# Patient Record
Sex: MALE
[De-identification: names, ages, dates, MRNs, and addresses within clinical notes are randomized per-mention and may not be internally consistent; named-entity substitution may affect disease eponyms.]

---

## 2020-01-24 ENCOUNTER — TRANSCRIBE ORDERS (OUTPATIENT)
Dept: OTHER | Age: 30
End: 2020-01-24

## 2020-01-24 DIAGNOSIS — J39.8 STENOSIS OF TRACHEA: Primary | ICD-10-CM

## 2020-01-27 ENCOUNTER — TELEPHONE (OUTPATIENT)
Dept: ONCOLOGY | Facility: CLINIC | Age: 30
End: 2020-01-27

## 2020-01-27 NOTE — TELEPHONE ENCOUNTER
Called patient.Left  with hours and phone. Letter sent for follow up. Referral in [facility].

## 2020-01-31 NOTE — TELEPHONE ENCOUNTER
Action    Action Taken 1/31/20:          RECORDS STATUS - ALL OTHER DIAGNOSIS      RECORDS RECEIVED FROM: [facility]   DATE RECEIVED:    NOTES STATUS DETAILS   OFFICE NOTE from referring provider     OFFICE NOTE from medical oncologist     DISCHARGE SUMMARY from hospital     DISCHARGE REPORT from the ER     OPERATIVE REPORT     MEDICATION LIST     CLINICAL TRIAL TREATMENTS TO DATE     LABS     PATHOLOGY REPORTS     ANYTHING RELATED TO DIAGNOSIS     GENONOMIC TESTING     TYPE:     IMAGING (NEED IMAGES & REPORT)     CT SCANS     MRI     MAMMO     ULTRASOUND     PET

## 2020-02-10 ENCOUNTER — TELEPHONE (OUTPATIENT)
Dept: PULMONOLOGY | Facility: CLINIC | Age: 30
End: 2020-02-10

## 2020-02-10 NOTE — TELEPHONE ENCOUNTER
Called patient to reschedule appointment scheduled with Dr. Cueto. Patient needs to see Interventional Pulmonology, per the clinic. Please schedule first available with Dr. Kenton Huff, Dr. Anastasia Wallace, Dr. Walt Liz, or Dr. Prasad Burton.

## 2020-02-11 NOTE — TELEPHONE ENCOUNTER
Action    Action Taken 2/11/20: LVM for pt re: recs call. IB sent to Clinic/Provider  9:23 AM    2/13/20: 2nd vm.   2:25 PM    2/14/20: 3rd VM  4:26 PM    -2/18/20: Pt returned call. Confirmed not recent tx//last treated when he was 14 (roughly 2005) @ [facility] (TOI emailed to pt). IB sent to provider/clinic.   11:32 AM         RECORDS STATUS - ALL OTHER DIAGNOSIS      RECORDS RECEIVED FROM:    DATE RECEIVED:    NOTES STATUS DETAILS   OFFICE NOTE from referring provider     OFFICE NOTE from medical oncologist     DISCHARGE SUMMARY from hospital     DISCHARGE REPORT from the ER     OPERATIVE REPORT     MEDICATION LIST     CLINICAL TRIAL TREATMENTS TO DATE     LABS     PATHOLOGY REPORTS     ANYTHING RELATED TO DIAGNOSIS     GENONOMIC TESTING     TYPE:     IMAGING (NEED IMAGES & REPORT)     CT SCANS     MRI     MAMMO     ULTRASOUND     PET

## 2020-02-12 ENCOUNTER — PRE VISIT (OUTPATIENT)
Dept: SURGERY | Facility: CLINIC | Age: 30
End: 2020-02-12

## 2020-02-18 DIAGNOSIS — J39.8 TRACHEAL STENOSIS: Primary | ICD-10-CM

## 2020-02-26 ENCOUNTER — ANCILLARY PROCEDURE (OUTPATIENT)
Dept: CT IMAGING | Facility: CLINIC | Age: 30
End: 2020-02-26
Attending: CLINICAL NURSE SPECIALIST
Payer: COMMERCIAL

## 2020-02-26 ENCOUNTER — OFFICE VISIT (OUTPATIENT)
Dept: PULMONOLOGY | Facility: CLINIC | Age: 30
End: 2020-02-26
Attending: INTERNAL MEDICINE
Payer: COMMERCIAL

## 2020-02-26 ENCOUNTER — TELEPHONE (OUTPATIENT)
Dept: OTOLARYNGOLOGY | Facility: CLINIC | Age: 30
End: 2020-02-26

## 2020-02-26 ENCOUNTER — PRE VISIT (OUTPATIENT)
Dept: PULMONOLOGY | Facility: CLINIC | Age: 30
End: 2020-02-26

## 2020-02-26 VITALS
OXYGEN SATURATION: 98 % | DIASTOLIC BLOOD PRESSURE: 79 MMHG | BODY MASS INDEX: 27.99 KG/M2 | SYSTOLIC BLOOD PRESSURE: 135 MMHG | HEART RATE: 88 BPM | TEMPERATURE: 98.2 F | RESPIRATION RATE: 16 BRPM | HEIGHT: 69 IN | WEIGHT: 189 LBS

## 2020-02-26 DIAGNOSIS — J39.8 TRACHEAL STENOSIS: Primary | ICD-10-CM

## 2020-02-26 DIAGNOSIS — J39.8 TRACHEAL STENOSIS: ICD-10-CM

## 2020-02-26 PROCEDURE — G0463 HOSPITAL OUTPT CLINIC VISIT: HCPCS | Mod: ZF

## 2020-02-26 ASSESSMENT — MIFFLIN-ST. JEOR: SCORE: 1812.28

## 2020-02-26 ASSESSMENT — PAIN SCALES - GENERAL: PAINLEVEL: NO PAIN (0)

## 2020-02-26 NOTE — PROGRESS NOTES
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  He has not seen or followed by anyone for the last 15 years.  He has noisy breathing/stridorous breathing while sleeping at night.  He has no any other new symptom.  He has no worsening of his symptoms.  His CT scan of the chest from today revealed no tracheal stenosis from the subglottic area down to his lungs.  Right vocal cord appeared to be asymmetric likely suggesting paralysis.  I will refer him to ear nose and throat (Dr. Janelle Gordon) for further evaluation.    Billing: I spent more than 30 minutes face to face and greater than 50% of time was for counseling and coordination of care about tracheal stenosis      Prasad Burton MD     History of Present Illness:    Cristopher Hayden is a 29 year old male with sig h/o for tracheal stenosis who is here for evaluation/followup of tracheal stenosis.  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  He has history of breathing problem as an infant requiring tracheostomy placement and multiple surgeries as indicated above.  He does not have shortness of breath however noisy breathing and stridorous breathing which has not changed for the last 15 years.  He has not been followed with any provider at this point but would like to be checked out to make sure that everything is fine.    Exposure history: Asbestos;  No , TB;  No , Radiation;   No       Social History      Socioeconomic History     Marital status: Single     Spouse name: Not on file     Number of children: Not on file     Years of education: Not on file     Highest education level: Not on file   Occupational History     Not on file   Social Needs     Financial resource strain: Not on file     Food insecurity:     Worry: Not on file     Inability: Not on file     Transportation needs:     Medical: Not on file     Non-medical: Not on file   Tobacco Use     Smoking status: Never Smoker     Smokeless tobacco: Never Used   Substance and Sexual Activity     Alcohol use: Not on file     Drug use: Not on file     Sexual activity: Not on file   Lifestyle     Physical activity:     Days per week: Not on file     Minutes per session: Not on file     Stress: Not on file   Relationships     Social connections:     Talks on phone: Not on file     Gets together: Not on file     Attends Anglican service: Not on file     Active member of club or organization: Not on file     Attends meetings of clubs or organizations: Not on file     Relationship status: Not on file     Intimate partner violence:     Fear of current or ex partner: Not on file     Emotionally abused: Not on file     Physically abused: Not on file     Forced sexual activity: Not on file   Other Topics Concern     Not on file   Social History Narrative     Not on file        No family history on file.       There is no immunization history on file for this patient.    No current outpatient medications on file.     No current facility-administered medications for this visit.         Review of Systems:  I have done 10 points of review systems and pertinent findings are  ,otherwise negative.    Physical examination  Constitutional: Alert, oriented, not in distress  Vitals: B/P: 135/79, T: 98.2, P: 88, R: 16  Eyes: No icterus, nystagmus, pupils isocoric   Musculoskeletal: Normal muscle mass, no dephormity  Integumentary:  No rash, normal texture and turgor, no  edema  Neurological: Alert, orientedx3, no motor deficits, cranial nerves grossly normal  Psychiatric:  Mood and affect are appropriate with insight into his/her medical condition  Hematologic/Immunologic/Lymphatic: No bruise, no lymph node enargement       Thank you for allowing me participate in the care of Cristopher HaydenSean MARTIN. Prasad Burton MD      Answers for HPI/ROS submitted by the patient on 2/25/2020   General Symptoms: No  Skin Symptoms: No  HENT Symptoms: No  EYE SYMPTOMS: No  HEART SYMPTOMS: No  LUNG SYMPTOMS: No  INTESTINAL SYMPTOMS: No  URINARY SYMPTOMS: No  REPRODUCTIVE SYMPTOMS: No  SKELETAL SYMPTOMS: No  BLOOD SYMPTOMS: No  NERVOUS SYSTEM SYMPTOMS: No  MENTAL HEALTH SYMPTOMS: No

## 2020-02-26 NOTE — TELEPHONE ENCOUNTER
Called patient and left a VM.    Informed him that I am calling from Dr. Gordon's office at the ENT clinic, and that we have received a referral from Dr. Prasad Burton.    I let the patient know that we have him scheduled for Friday 3/6 for a consult with Dr. Gordon, and provided my direct line to call back if appointment date/time does not work.

## 2020-02-26 NOTE — TELEPHONE ENCOUNTER
FUTURE VISIT INFORMATION      FUTURE VISIT INFORMATION:    Date: 3/6/20    Time: 12:30 PM    [place] [facility] [place]  REFERRAL INFORMATION:    Referring provider:  Dr. Prasad Burton    Referring providers clinic:  [facility] - Pulmonology    Reason for visit/diagnosis: Tracheal Stenosis    RECORDS REQUESTED FROM:       Clinic name Comments Records Status Imaging Status   [facility] 2/26/20 - PULM OV with Dr. Burton Epic    [facility] - Imaging 2/26/20 - CT Chest WO Epic PACs         [facility] 3 Laryngotracheoplasties between 1990s to 2000s (Dr. Arreaga) 3/19 Sent to Scan                  * 2/26/20 1:58 PM Faxed Urgent req to [facility] for OP notes to be sent to clinic - [place]  * 2/28/20 8:30 AM LVM with patient to have him sign a release of information so that we can get the OP note from [facility], e-mailed TOI - [place]  * 3/4/20 8:48 AM Left 3rd  with patient for TOI to be signed, closing encounter for now and notified nurse to get TOI signed when in clinic - [place]le  * 3/4/20 Faxed req with Signed TOI to [facility] for OP Notes - [place]le

## 2020-02-26 NOTE — NURSING NOTE
"Oncology Rooming Note    February 26, 2020 11:05 AM   Cristopher Hayden is a 29 year old male who presents for:    Chief Complaint   Patient presents with     New Patient     NEW PT; STENOSIS OF TRACHEA; VITALS TAKEN     Initial Vitals: /79   Pulse 88   Temp 98.2  F (36.8  C) (Oral)   Resp 16   Ht 1.76 m (5' 9.29\")   Wt 85.7 kg (189 lb)   SpO2 98%   BMI 27.68 kg/m   Estimated body mass index is 27.68 kg/m  as calculated from the following:    Height as of this encounter: 1.76 m (5' 9.29\").    Weight as of this encounter: 85.7 kg (189 lb). Body surface area is 2.05 meters squared.  No Pain (0) Comment: Data Unavailable   No LMP for male patient.  Allergies reviewed: Yes  Medications reviewed: Yes    Medications: Medication refills not needed today.  Pharmacy name entered into EPIC: Data Unavailable    Clinical concerns: No new concerns today  Dr Burton  was notified.      Jane Laws              "

## 2020-02-26 NOTE — LETTER
2/26/2020       RE: Cristopher Hayden  [address]  [place] [address]     Dear Colleague,    Thank you [facility] your [facility]. Please see a copy of my visit note below.    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  He has not seen or followed by anyone for the last 15 years.  He has noisy breathing/stridorous breathing while sleeping at night.  He has no any other new symptom.  He has no worsening of his symptoms.  His CT scan of the chest from today revealed no tracheal stenosis from the subglottic area down to his lungs.  Right vocal cord appeared to be asymmetric likely suggesting paralysis.  I will refer him to ear nose and throat (Dr. Janelle Gordon) for further evaluation.    Billing: I spent more than 30 minutes face to face and greater than 50% of time was for counseling and coordination of care about tracheal stenosis      Prasad Burton MD     History of Present Illness:    Cristopher Hayden is a 29 year old male with sig h/o for tracheal stenosis who is here for evaluation/followup of tracheal stenosis.  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  He has history of breathing problem as an infant requiring tracheostomy placement and multiple surgeries as indicated above.  He does not have shortness of breath however noisy breathing  and stridorous breathing which has not changed for the last 15 years.  He has not been followed with any provider at this point but would like to be checked out to make sure that everything is fine.    Exposure history: Asbestos;  No , TB;  No , Radiation;   No       Social History     Socioeconomic History     Marital status: Single     Spouse name: Not on file     Number of children: Not on file     Years of education: Not on file     Highest education level: Not on file   Occupational History     Not on file   Social Needs     Financial resource strain: Not on file     Food insecurity:     Worry: Not on file     Inability: Not on file     Transportation needs:     Medical: Not on file     Non-medical: Not on file   Tobacco Use     Smoking status: Never Smoker     Smokeless tobacco: Never Used   Substance and Sexual Activity     Alcohol use: Not on file     Drug use: Not on file     Sexual activity: Not on file   Lifestyle     Physical activity:     Days per week: Not on file     Minutes per session: Not on file     Stress: Not on file   Relationships     Social connections:     Talks on phone: Not on file     Gets together: Not on file     Attends Taoist service: Not on file     Active member of club or organization: Not on file     Attends meetings of clubs or organizations: Not on file     Relationship status: Not on file     Intimate partner violence:     Fear of current or ex partner: Not on file     Emotionally abused: Not on file     Physically abused: Not on file     Forced sexual activity: Not on file   Other Topics Concern     Not on file   Social History Narrative     Not on file        No family history on file.       There is no immunization history on file for this patient.    No current outpatient medications on file.     No current facility-administered medications for this visit.         Review of Systems:  I have done 10 points of review systems and pertinent findings are  ,otherwise  negative.    Physical examination  Constitutional: Alert, oriented, not in distress  Vitals: B/P: 135/79, T: 98.2, P: 88, R: 16  Eyes: No icterus, nystagmus, pupils isocoric   Musculoskeletal: Normal muscle mass, no dephormity  Integumentary:  No rash, normal texture and turgor, no edema  Neurological: Alert, orientedx3, no motor deficits, cranial nerves grossly normal  Psychiatric:  Mood and affect are appropriate with insight into his/her medical condition  Hematologic/Immunologic/Lymphatic: No bruise, no lymph node enargement       Thank you for allowing me participate in the care of Cristopher BYNUM Prasad Burton MD      Answers for HPI/ROS submitted by the patient on 2/25/2020   General Symptoms: No  Skin Symptoms: No  HENT Symptoms: No  EYE SYMPTOMS: No  HEART SYMPTOMS: No  LUNG SYMPTOMS: No  INTESTINAL SYMPTOMS: No  URINARY SYMPTOMS: No  REPRODUCTIVE SYMPTOMS: No  SKELETAL SYMPTOMS: No  BLOOD SYMPTOMS: No  NERVOUS SYSTEM SYMPTOMS: No  MENTAL HEALTH SYMPTOMS: No      Again, thank you for allowing me to participate in the care of your patient.      Sincerely,    Jeremiah Burton MD

## 2020-03-05 NOTE — PROGRESS NOTES
[facility]   at the [facility]   Otolaryngology Clinic     Patient: Cristopher Hayden    MRN: [id number]    : 1990    Age/Gender: 29 year old male  Date of Service: 3/6/2020  Rendering Provider:   Janelle Gordon MD       Referring Provider   PCP: Adilson Calvin  Referring Physician: Prasad Burton   Reason for Consultation   History of subglottic stenosis s/p reconstruction  Snoring at night  History   HISTORY OF PRESENT ILLNESS: I was asked to consult on Cristopher Hayden, by Prasad Burton for evaluation of subglottic stenosis. Mr. 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 He has not had an ENT in 15 years, so he is here today to establish care and make sure his throat looks okay. 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 He has always had stridorous breathing at night. He states this goes on for 80-90% of the time he is sleeping. It does not wake him, but it wakes others up. This has not worsened. He has not had any sleep studies since he was young. He does not fall asleep during the day, but does report intermittent fatigue. He notes he has had echos to evaluate whether his breathing is affecting his heart.    He also reports mild shortness of breath, but it does not affect him much. When he gets sick, he sometimes needs steroids to help with inflammation, but not always. He had influenza last year and was fine during that time. He is fine taking the stairs and working out and does not feel his activities are limited. He notes he has been clearing his throat more frequently lately, but thinks this might be due to allergies. He reports occasional epistaxis.    He denies dysphagia. His dysphonia does not bother him and his voice has always been this way and is not worsening.    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     Procedure history  Tracheostomy - 1990s  Laryngotracheoplasty (x3) - 1990s-2000s at [facility]  Left partial endoscopic laser arytenoidectomy - 07/05/2006    The patient is here today accompanied by his mother.     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         PAST SURGICAL HISTORY:   Past Surgical History:   Procedure Laterality Date     ------------OTHER------------- Left 07/05/2006    Left partial endoscopic laser arytenoidectomy      TRACHEOSTOMY  1990s    in childhood at 3 years of age           CURRENT MEDICATIONS:   Current Outpatient Medications:      amoxicillin-clavulanate (AUGMENTIN) 875-125 MG tablet, Take 1 tablet by mouth 2 times daily, Disp: 28 tablet, Rfl: 0     methylPREDNISolone (MEDROL DOSEPAK) 4 MG tablet therapy pack, Follow Package Directions, Disp: 21 tablet, Rfl: 0      ALLERGIES: Patient has no known allergies.      SOCIAL HISTORY:    Social History     Socioeconomic History     Marital status: Single     Spouse name: Not on file     Number of children: Not on file     Years of education: Not on file     Highest education level: Not on file   Occupational History     Not on file   Social Needs     Financial resource strain: Not on file     Food insecurity     Worry: Not on file     Inability: Not on file     Transportation needs     Medical: Not on file     Non-medical: Not on file   Tobacco Use     Smoking status: Never Smoker     Smokeless tobacco: Never Used   Substance and Sexual Activity     Alcohol use: Not on file     Drug use: Not on file     Sexual activity: Not on file   Lifestyle     Physical activity     Days per week: Not on  file     Minutes per session: Not on file     Stress: Not on file   Relationships     Social connections     Talks on phone: Not on file     Gets together: Not on file     Attends [employer] service: Not on file     Active member of club or organization: Not on file     Attends meetings of clubs or organizations: Not on file     Relationship status: Not on file     Intimate partner violence     Fear of current or ex partner: Not on file     Emotionally abused: Not on file     Physically abused: Not on file     Forced sexual activity: Not on file   Other Topics Concern     Not on file   Social History Narrative     Not on file         FAMILY HISTORY: No family history on file.  Non-contributory for problems with anesthesia    REVIEW OF SYSTEMS:   The patient was asked a 14 point review of systems regarding constitutional symptoms, eye symptoms, ears, nose, mouth, throat symptoms, cardiovascular symptoms, respiratory symptoms, gastrointestinal symptoms, genitourinary symptoms, musculoskeletal symptoms, integumentary symptoms, neurological symptoms, psychiatric symptoms, endocrine symptoms, hematologic/lymphatic symptoms, and allergic/ immunologic symptoms.   The pertinent factors have been included in the HPI and below.    Physical Examination     The patient underwent a physical examination as described below. The pertinent positive and negative findings are summarized after the description of the examination.    Constitutional: The patient's developmental and nutritional status was assessed. The patient's voice quality was assessed.  Head and Face: The head and face were inspected for deformities. The sinuses were palpated. The salivary glands were palpated. Facial muscle strength was assessed bilaterally.  Eyes: Extraocular movements and primary gaze alignment were assessed.  Ears, Nose, Mouth and Throat: The ears and nose were examined for deformities. The nasal septum, mucosa, and turbinates were inspected by  anterior rhinoscopy. The lips, teeth, and gums were examined for abnormalities. The oral mucosa, tongue, palate, tonsils, lateral and posterior pharynx were inspected for the presence of asymmetry or mucosal lesions.    Neck: The tracheal position was noted, and the neck mass palpated to determine if there were any asymmetries, abnormal neck masses, thyromegally, or thyroid nodules.  Respiratory: The nature of the breathing and chest expansion/symmetry was observed.  Cardiovascular: The patient was examined to determine the presence of any edema or jugular venous distension.  Abdomen: The contour of the abdomen was noted.  Lymphatic: The patient was examined for infraclavicular lymphadenopathy.  Musculoskeletal: The patient was inspected for the presence of skeletal deformities.  Extremities: The extremities were examined for any clubbing or cyanosis.  Skin: The skin was examined for inflammatory or neoplastic conditions.  Neurologic: The patient's orientation, mood, and affect were noted. The cranial nerve  functions were examined.    Other pertinent positive and negative findings on physical examination:   each ear: EACs with bilateral cerumen impaction  Anterior rhinoscopy: no lesions  OC/OP: no lesions, uvula midline, prior tonsillectomy, soft palate elevates symmetrically, FOM/BOT soft, mild lower kimberly   Neck: well-healed midline incision, no TH tenderness to palpation    All other physical examination findings were within normal limits and noncontributory.    Procedures     Flexible laryngoscopy (CPT 57918)      Pre-procedure diagnosis:  Post-procedure diagnosis:  Indication for procedure: Mr. Hayden is a 29 year old male with a history of subglottic stenosis  Procedure(s): Fiberoptic Laryngoscopy    Details of Procedure: After informed consent was obtained, the patient was seated in the examination chair.  The areas of the nasopharynx as well as the hypopharynx were anesthetized with topical 4% lidocaine with  0.25% phenylephrine atomizer.  Examination of the base of tongue was performed first.  Attention was directed to any evidence of masses in the area or evidence of leukoplakia or candidal infection.  Attention was directed to the epiglottis where its size and position was determined and its movement on phonation of the vowel  e .  The piriform sinuses were then inspected for any mass lesions or pooling of secretions.  Attention was then directed to the larynx. The vocal folds were inspected for infection or any areas of leukoplakia, for masses, polypoid degeneration, or hemorrhage.  Having done this, the arytenoids and vocal processes were inspected for erythema or evidence of granuloma formation.  The posterior commissure was then inspected for evidence of inflammatory changes in the mucosa and heaping up of mucosal tissue. The patient was then instructed to say the vowel  e .  Adduction of vocal folds to the midline was observed for any evidence of paresis or paralysis of the larynx or asymmetry in rotation of the larynx to the left or right. The patient was asked to breathe and the degree of abduction was noted bilaterally.  Subglottic view of the larynx was obtained for any additional mass lesions or mucosal changes.  Finally the post cricoid was examined for evidence of pooling of secretions, as well as the pharyngeal wall mucosa.   Anesthesia type: 0.25% phenylephrine    Findings  Anatomic/physiological deviations: Supraglottic stenosis with dynamic collapse, anterior glottic web, difficult to see further   Right vocal process: n/a   Left vocal process: n/a   Glottal gap: yes  Supraglottic structures: some abduction movement seen with e sniff, posterior stenosis   Hypopharynx: Normal     Estimated Blood Loss: minimal  Complications: None  Disposition: Patient tolerated the procedure well              Bronchoscopy, Diagnostic    Pre-Procedure Diagnosis: supraglottic stenosis  Post Procedure Diagnosis:  supraglottic stenosis  Indication for procedure:  Mr. Hayden is a 29 year old year old male with supraglottic stenosis  Procedure(s): Bronchoscopy, Diagnostic  Details of Procedure: Topical anesthesia was achieved by spraying 4% topical lidocaine directly into the trachea either through a working channel in the endoscope or via a trans-cutaneous route.  After adequate anesthesia was achieved a flexible bronchoscope was passed through the glottis into the trachea.  Abnormalities were noted. Having completed this the operation was completed and the scope withdrawn atraumatically.   Anesthesia type: 4% topical lidocaine    Findings:   Anterior glottic gap and patent posterior glottic airway, multi level stenosis of the subglottis, able to see tracheal rings beyond  Complication(s): None  Disposition: patient had coughing throughout the procedure        Review of Relevant Clinical Data     Notes: Jeremiah Burton 2/26/20     Radiology: CT chest 2/26/20  1.  Subglottic trachea is within normal limits.  2.  Scattered solid pulmonary nodules measuring up to 5 mm, likely  sequela of prior infection versus inflammation.  3.  Chronic right rib deformities with asymmetric volume loss of the  right hemithorax.      Pathology: none    Procedures: none    Labs:  No results found for: TSH  No results found for: NA, CO2, BUN, CREAT, GLUCOSE, PHOS  No results found for: WBC, HGB, HCT, MCV, PLT  No results found for: PT, PTT, INR  No results found for: PARISH  No components found for: RHEUMATOIDFACTOR,  RF  No results found for: CRP  No components found for: CKTOT, URICACID  No components found for: C3, C4, DSDNAAB, NDNAABIFA  No results found for: MPOAB    Patient reported Quality of Life (QOL) Measures     Patient Supplied Answers To VHI Questionnaire  Voice Handicap Index (VHI-10) 3/5/2020   My voice makes it difficult for people to hear me 2   People have difficulty understanding me in a noisy room 2   My voice difficulties  "restrict my personal and social life.  0   I feel left out of conversations because of my voice 0   My voice problem causes me to lose income 0   I feel as though I have to strain to produce voice 0   The clarity of my voice is unpredictable 0   My voice problem upsets me 0   My voice makes me feel handicapped 0   People ask, \"What's wrong with your voice?\" 1   VHI-10 5         Patient Supplied Answers To EAT Questionnaire  Eating Assessment Tool (EAT-10) 3/5/2020   My swallowing problem has caused me to lose weight 0   My swallowing problem interferes with my ability to go out for meals 0   Swallowing liquids takes extra effort 0   Swallowing solids takes extra effort 0   Swallowing pills takes extra effort 0   Swallowing is painful 0   The pleasure of eating is affected by my swallowing 0   When I swallow food sticks in my throat 0   I cough when I eat 0   Swallowing is stressful 0   EAT-10 0         Patient Supplied Answers To CSI Questionnaire  Cough Severity Index (CSI) 3/5/2020   My cough is worse when I lie down 0   My coughing problem causes me to restrict my personal and social life 0   I tend to avoid places because of my cough problem 0   I feel embarrassed because of my coughing problem 0   People ask, ''What's wrong?'' because I cough a lot 0   I run out of air when I cough 0   My coughing problem affects my voice 0   My coughing problem limits my physical activity 0   My coughing problem upsets me 0   People ask me if I am sick because I cough a lot 0   CSI Score 0        Reflux Symptom Index  Within the last month, how did the following problems affect the patient?  0 = no problem; 5= severe problem  Hoarseness or a problem with your voice 0   Clearing your throat  2   Excess throat mucous or postnasal drip 0   Difficulty swallowing food, liquid or pills 0   Coughing after you ate or after lying down  0   Breathing difficulties or choking episodes 0   Troublesome or annoying cough 0   Sensations of " something sticking in your throat or a lump in your throat  0   Heartburn, chest pain, indigestion, or stomach acid coming up 1         Total: 3    Impression & Plan     IMPRESSION: Mr. Hayden is a 29 year old male who is being seen for the followin. Airway stenosis   - due to prematurity with subglottic stenosis which required reconstruction and multiple procedures. decannulated at 3 years old. Has not had dyspnea or complaints in many years. His CT reveals a patent trachea. Today his scope reveals supraglottic stenosis, an anterior glottic web and subglottic stenosis  - he has no stridor with deep inspiration and is able to go up the stairs and exercise without problems  - discussed that his airway despite its stability and lack of symptoms is narrowed and at risk for episodes of URI   - provided steroids and antibiotics to have on hand for when he is sick  - further discussed that intubation for him would be very difficult with only a small 4 or 5 MLT or PMLT tube, he will need airway clearance for any future procedure and ideally would avoid intubation. He understands that a trach could be possible   - discussed the balance of breathing, voicing and eating and at the current time he is in a good balance and would avoid any surgery  - fixing the supraglottic stenosis would likely lead to dysphagia due to aspiration, no problems with swallow at this time therefore deferred further swallow testing  - will continue to monitor    2. Snoring at night breathing  - this occurs only at night and has been stable for many years and his main concern today  - most likely this represents collapse of the supraglottic tissue at night   - referred for sleep evaluation and sleep study    3. 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     Scribe Disclosure:  I, Isadora Garay, am serving as a scribe to document services personally performed by Janelle Gordon MD at this visit, based upon the provider's statements to me. All documentation has been reviewed by the aforementioned provider prior to being entered into the official medical record.    The documentation recorded by the scribe accurately reflects the services I personally performed and the decisions made by me.    Thank you for the kind referral and for allowing me to share in the care of Mr. Hayden. If you have any questions, please do not hesitate to contact me.    Janelle Gordon MD    of Otolaryngology - Head and Neck Surgery   Voice, Airway, and Swallowing Disorders   [facility] at the [facility]    Clinics & Surgery [address] [address]  Phone: [phone]  Fax: [phone]    [place] [address]  Phone: [phone]  Fax: [phone]     CC: Jeremiah Burton

## 2020-03-05 NOTE — TELEPHONE ENCOUNTER
Talked to Representative from [facility] to check on status of getting OP Notes sent to clinic as we faxed signed TOI.  She stated that it is in the paper charts so it make take some time for them to be able to get the records and send them to us, but they are working on it right now.

## 2020-03-06 ENCOUNTER — OFFICE VISIT (OUTPATIENT)
Dept: OTOLARYNGOLOGY | Facility: CLINIC | Age: 30
End: 2020-03-06
Payer: COMMERCIAL

## 2020-03-06 ENCOUNTER — PRE VISIT (OUTPATIENT)
Dept: OTOLARYNGOLOGY | Facility: CLINIC | Age: 30
End: 2020-03-06

## 2020-03-06 VITALS — HEIGHT: 69 IN | WEIGHT: 150 LBS | BODY MASS INDEX: 22.22 KG/M2

## 2020-03-06 DIAGNOSIS — R06.83 SNORING: Primary | ICD-10-CM

## 2020-03-06 DIAGNOSIS — J38.6 SUBGLOTTIC STENOSIS: ICD-10-CM

## 2020-03-06 DIAGNOSIS — J38.6 SUPRAGLOTTIC STENOSIS: ICD-10-CM

## 2020-03-06 DIAGNOSIS — Q31.0 ANTERIOR GLOTTIC WEB: ICD-10-CM

## 2020-03-06 PROBLEM — J39.8 STENOSIS OF TRACHEA: Status: ACTIVE | Noted: 2020-03-06

## 2020-03-06 RX ORDER — METHYLPREDNISOLONE 4 MG
TABLET, DOSE PACK ORAL
Qty: 21 TABLET | Refills: 0 | Status: SHIPPED | OUTPATIENT
Start: 2020-03-06

## 2020-03-06 ASSESSMENT — MIFFLIN-ST. JEOR: SCORE: 1630.78

## 2020-03-06 ASSESSMENT — PAIN SCALES - GENERAL: PAINLEVEL: NO PAIN (0)

## 2020-03-06 NOTE — NURSING NOTE
"Chief Complaint   Patient presents with     Consult     History of tracheal stenosis, dyspnea     Height 1.753 m (5' 9\"), weight 68 kg (150 lb).    Fatimah Neumann, EMT  "

## 2020-03-06 NOTE — LETTER
3/6/2020       RE: Cristopher Hayden  [address]  [place] [address]     Dear Colleague,    Thank you for referring your patient, Cristopher Hayden, to the [facility] EAR NOSE AND THROAT at [facility]. Please see a copy of my visit note below.        [facility]   at the [facility]   Otolaryngology Clinic     Patient: Cristopher Hayden    MRN: [id number]    : 1990    Age/Gender: 29 year old male  Date of Service: 3/6/2020  Rendering Provider:   Janelle Gordon MD       Referring Provider   PCP: Adilson Calvin  Referring Physician: Prasad Burton   Reason for Consultation   History of subglottic stenosis s/p reconstruction  Snoring at night  History   HISTORY OF PRESENT ILLNESS: I was asked to consult on Cristopher Hayden, by Prasad Burton for evaluation of subglottic stenosis. Mr. 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 He has not had an ENT in 15 years, so he is here today to establish care and make sure his throat looks okay. 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 He has always had stridorous breathing at night. He states this goes on for 80-90% of the time he is sleeping. It does not wake him, but it wakes others up. This has not worsened. He has not had any sleep studies since he was young. He does not fall asleep during the day, but does report intermittent fatigue. He notes he has had echos to evaluate whether his breathing is affecting his heart.    He also reports mild shortness of breath, but it does not affect him much. When he gets sick, he sometimes needs steroids to help with inflammation, but not always. He had influenza last year and was fine during that time. He is fine taking the stairs and working out and does not feel his activities are limited. He notes he has been clearing his throat more  frequently lately, but thinks this might be due to allergies. He reports occasional epistaxis.    He denies dysphagia. His dysphonia does not bother him and his voice has always been this way and is not worsening.    He works as a respiratory therapist at 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     Procedure history  Tracheostomy - 1990s  Laryngotracheoplasty (x3) - 1990s-2000s at [facility]  Left partial endoscopic laser arytenoidectomy - 07/05/2006    The patient is here today accompanied by his mother.     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         PAST SURGICAL HISTORY:   Past Surgical History:   Procedure Laterality Date     ------------OTHER------------- Left 07/05/2006    Left partial endoscopic laser arytenoidectomy      TRACHEOSTOMY  1990s    in childhood at 3 years of age           CURRENT MEDICATIONS:   Current Outpatient Medications:      amoxicillin-clavulanate (AUGMENTIN) 875-125 MG tablet, Take 1 tablet by mouth 2 times daily, Disp: 28 tablet, Rfl: 0     methylPREDNISolone (MEDROL DOSEPAK) 4 MG tablet therapy pack, Follow Package Directions, Disp: 21 tablet, Rfl: 0      ALLERGIES: Patient has no known allergies.      SOCIAL HISTORY:    Social History     Socioeconomic History     Marital status: Single     Spouse name: Not on file     Number of children: Not on file     Years of education: Not on file     Highest education level: Not on file   Occupational History     Not on file   Social Needs     Financial resource strain: Not on file     Food insecurity     Worry: Not on file     Inability: Not on file     Transportation needs     Medical: Not on  file     Non-medical: Not on file   Tobacco Use     Smoking status: Never Smoker     Smokeless tobacco: Never Used   Substance and Sexual Activity     Alcohol use: Not on file     Drug use: Not on file     Sexual activity: Not on file   Lifestyle     Physical activity     Days per week: Not on file     Minutes per session: Not on file     Stress: Not on file   Relationships     Social connections     Talks on phone: Not on file     Gets together: Not on file     Attends Hindu service: Not on file     Active member of club or organization: Not on file     Attends meetings of clubs or organizations: Not on file     Relationship status: Not on file     Intimate partner violence     Fear of current or ex partner: Not on file     Emotionally abused: Not on file     Physically abused: Not on file     Forced sexual activity: Not on file   Other Topics Concern     Not on file   Social History Narrative     Not on file         FAMILY HISTORY: No family history on file.  Non-contributory for problems with anesthesia    REVIEW OF SYSTEMS:   The patient was asked a 14 point review of systems regarding constitutional symptoms, eye symptoms, ears, nose, mouth, throat symptoms, cardiovascular symptoms, respiratory symptoms, gastrointestinal symptoms, genitourinary symptoms, musculoskeletal symptoms, integumentary symptoms, neurological symptoms, psychiatric symptoms, endocrine symptoms, hematologic/lymphatic symptoms, and allergic/ immunologic symptoms.   The pertinent factors have been included in the HPI and below.    Physical Examination     The patient underwent a physical examination as described below. The pertinent positive and negative findings are summarized after the description of the examination.    Constitutional: The patient's developmental and nutritional status was assessed. The patient's voice quality was assessed.  Head and Face: The head and face were inspected for deformities. The sinuses were palpated. The  salivary glands were palpated. Facial muscle strength was assessed bilaterally.  Eyes: Extraocular movements and primary gaze alignment were assessed.  Ears, Nose, Mouth and Throat: The ears and nose were examined for deformities. The nasal septum, mucosa, and turbinates were inspected by anterior rhinoscopy. The lips, teeth, and gums were examined for abnormalities. The oral mucosa, tongue, palate, tonsils, lateral and posterior pharynx were inspected for the presence of asymmetry or mucosal lesions.    Neck: The tracheal position was noted, and the neck mass palpated to determine if there were any asymmetries, abnormal neck masses, thyromegally, or thyroid nodules.  Respiratory: The nature of the breathing and chest expansion/symmetry was observed.  Cardiovascular: The patient was examined to determine the presence of any edema or jugular venous distension.  Abdomen: The contour of the abdomen was noted.  Lymphatic: The patient was examined for infraclavicular lymphadenopathy.  Musculoskeletal: The patient was inspected for the presence of skeletal deformities.  Extremities: The extremities were examined for any clubbing or cyanosis.  Skin: The skin was examined for inflammatory or neoplastic conditions.  Neurologic: The patient's orientation, mood, and affect were noted. The cranial nerve  functions were examined.    Other pertinent positive and negative findings on physical examination:   each ear: EACs with bilateral cerumen impaction  Anterior rhinoscopy: no lesions  OC/OP: no lesions, uvula midline, prior tonsillectomy, soft palate elevates symmetrically, FOM/BOT soft, mild lower kimberly   Neck: well-healed midline incision, no TH tenderness to palpation    All other physical examination findings were within normal limits and noncontributory.    Procedures     Flexible laryngoscopy (CPT 00913)      Pre-procedure diagnosis:  Post-procedure diagnosis:  Indication for procedure: Mr. Hayden is a 29 year old male with  a history of subglottic stenosis  Procedure(s): Fiberoptic Laryngoscopy    Details of Procedure: After informed consent was obtained, the patient was seated in the examination chair.  The areas of the nasopharynx as well as the hypopharynx were anesthetized with topical 4% lidocaine with 0.25% phenylephrine atomizer.  Examination of the base of tongue was performed first.  Attention was directed to any evidence of masses in the area or evidence of leukoplakia or candidal infection.  Attention was directed to the epiglottis where its size and position was determined and its movement on phonation of the vowel  e .  The piriform sinuses were then inspected for any mass lesions or pooling of secretions.  Attention was then directed to the larynx. The vocal folds were inspected for infection or any areas of leukoplakia, for masses, polypoid degeneration, or hemorrhage.  Having done this, the arytenoids and vocal processes were inspected for erythema or evidence of granuloma formation.  The posterior commissure was then inspected for evidence of inflammatory changes in the mucosa and heaping up of mucosal tissue. The patient was then instructed to say the vowel  e .  Adduction of vocal folds to the midline was observed for any evidence of paresis or paralysis of the larynx or asymmetry in rotation of the larynx to the left or right. The patient was asked to breathe and the degree of abduction was noted bilaterally.  Subglottic view of the larynx was obtained for any additional mass lesions or mucosal changes.  Finally the post cricoid was examined for evidence of pooling of secretions, as well as the pharyngeal wall mucosa.   Anesthesia type: 0.25% phenylephrine    Findings  Anatomic/physiological deviations: Supraglottic stenosis with dynamic collapse, anterior glottic web, difficult to see further   Right vocal process: n/a   Left vocal process: n/a   Glottal gap: yes  Supraglottic structures: some abduction movement  seen with e sniff, posterior stenosis   Hypopharynx: Normal     Estimated Blood Loss: minimal  Complications: None  Disposition: Patient tolerated the procedure well              Bronchoscopy, Diagnostic    Pre-Procedure Diagnosis: supraglottic stenosis  Post Procedure Diagnosis: supraglottic stenosis  Indication for procedure:  Mr. Hayden is a 29 year old year old male with supraglottic stenosis  Procedure(s): Bronchoscopy, Diagnostic  Details of Procedure: Topical anesthesia was achieved by spraying 4% topical lidocaine directly into the trachea either through a working channel in the endoscope or via a trans-cutaneous route.  After adequate anesthesia was achieved a flexible bronchoscope was passed through the glottis into the trachea.  Abnormalities were noted. Having completed this the operation was completed and the scope withdrawn atraumatically.   Anesthesia type: 4% topical lidocaine    Findings:   Anterior glottic gap and patent posterior glottic airway, multi level stenosis of the subglottis, able to see tracheal rings beyond  Complication(s): None  Disposition: patient had coughing throughout the procedure        Review of Relevant Clinical Data     Notes: Jeremiah Burton 2/26/20     Radiology: CT chest 2/26/20  1.  Subglottic trachea is within normal limits.  2.  Scattered solid pulmonary nodules measuring up to 5 mm, likely  sequela of prior infection versus inflammation.  3.  Chronic right rib deformities with asymmetric volume loss of the  right hemithorax.      Pathology: none    Procedures: none    Labs:  No results found for: TSH  No results found for: NA, CO2, BUN, CREAT, GLUCOSE, PHOS  No results found for: WBC, HGB, HCT, MCV, PLT  No results found for: PT, PTT, INR  No results found for: PARISH  No components found for: RHEUMATOIDFACTOR,  RF  No results found for: CRP  No components found for: CKTOT, URICACID  No components found for: C3, C4, DSDNAAB, NDNAABIFA  No results found for:  "MPOAB    Patient reported Quality of Life (QOL) Measures     Patient Supplied Answers To VHI Questionnaire  Voice Handicap Index (VHI-10) 3/5/2020   My voice makes it difficult for people to hear me 2   People have difficulty understanding me in a noisy room 2   My voice difficulties restrict my personal and social life.  0   I feel left out of conversations because of my voice 0   My voice problem causes me to lose income 0   I feel as though I have to strain to produce voice 0   The clarity of my voice is unpredictable 0   My voice problem upsets me 0   My voice makes me feel handicapped 0   People ask, \"What's wrong with your voice?\" 1   VHI-10 5         Patient Supplied Answers To EAT Questionnaire  Eating Assessment Tool (EAT-10) 3/5/2020   My swallowing problem has caused me to lose weight 0   My swallowing problem interferes with my ability to go out for meals 0   Swallowing liquids takes extra effort 0   Swallowing solids takes extra effort 0   Swallowing pills takes extra effort 0   Swallowing is painful 0   The pleasure of eating is affected by my swallowing 0   When I swallow food sticks in my throat 0   I cough when I eat 0   Swallowing is stressful 0   EAT-10 0         Patient Supplied Answers To CSI Questionnaire  Cough Severity Index (CSI) 3/5/2020   My cough is worse when I lie down 0   My coughing problem causes me to restrict my personal and social life 0   I tend to avoid places because of my cough problem 0   I feel embarrassed because of my coughing problem 0   People ask, ''What's wrong?'' because I cough a lot 0   I run out of air when I cough 0   My coughing problem affects my voice 0   My coughing problem limits my physical activity 0   My coughing problem upsets me 0   People ask me if I am sick because I cough a lot 0   CSI Score 0        Reflux Symptom Index  Within the last month, how did the following problems affect the patient?  0 = no problem; 5= severe problem  Hoarseness or a " problem with your voice 0   Clearing your throat  2   Excess throat mucous or postnasal drip 0   Difficulty swallowing food, liquid or pills 0   Coughing after you ate or after lying down  0   Breathing difficulties or choking episodes 0   Troublesome or annoying cough 0   Sensations of something sticking in your throat or a lump in your throat  0   Heartburn, chest pain, indigestion, or stomach acid coming up 1         Total: 3    Impression & Plan     IMPRESSION: Mr. Hayden is a 29 year old male who is being seen for the followin. Airway stenosis   - due to prematurity with subglottic stenosis which required reconstruction and multiple procedures. decannulated at 3 years old. Has not had dyspnea or complaints in many years. His CT reveals a patent trachea. Today his scope reveals supraglottic stenosis, an anterior glottic web and subglottic stenosis  - he has no stridor with deep inspiration and is able to go up the stairs and exercise without problems  - discussed that his airway despite its stability and lack of symptoms is narrowed and at risk for episodes of URI   - provided steroids and antibiotics to have on hand for when he is sick  - further discussed that intubation for him would be very difficult with only a small 4 or 5 MLT or PMLT tube, he will need airway clearance for any future procedure and ideally would avoid intubation. He understands that a trach could be possible   - discussed the balance of breathing, voicing and eating and at the current time he is in a good balance and would avoid any surgery  - fixing the supraglottic stenosis would likely lead to dysphagia due to aspiration, no problems with swallow at this time therefore deferred further swallow testing  - will continue to monitor    2. Snoring at night breathing  - this occurs only at night and has been stable for many years and his main concern today  - most likely this represents collapse of the supraglottic tissue at night   -  referred for sleep evaluation and sleep study    3. 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     Scribe Disclosure:  I, Isadora Garay, am serving as a scribe to document services personally performed by Janelle Gordon MD at this visit, based upon the provider's statements to me. All documentation has been reviewed by the aforementioned provider prior to being entered into the official medical record.    The documentation recorded by the scribe accurately reflects the services I personally performed and the decisions made by me.    Thank you for the kind referral and for allowing me to share in the care of Mr. Hayden. If you have any questions, please do not hesitate to contact me.    Janelle Gordon MD    of Otolaryngology - Head and Neck Surgery   Voice, Airway, and Swallowing Disorders   [facility] at the [facility]    Clinics & Surgery Center  [address] [place] [address]  Phone: [phone]  Fax: [phone]    [place] [address]  Phone: [phone]  Fax: [phone]     CC: Jeremiah Burton

## 2020-03-06 NOTE — PATIENT INSTRUCTIONS
You were seen in the ENT clinic today with Dr. Gordon  Recommendations for you:   1) Please call  if you develop any breathing problems.    2) prescription from pharmacy    3)Please scheule Sleep evaluation         We would like you to follow up after you complete sleep study.      Please call our clinic for any questions, concerns, and/or worsening symptoms.      Clinic #[phone]       Option 1 for scheduling.    Thank you for allowing us to be apart of your care!    Cornelia CHENG RN

## 2020-03-11 ENCOUNTER — HEALTH MAINTENANCE LETTER (OUTPATIENT)
Age: 30
End: 2020-03-11

## 2020-03-16 ENCOUNTER — TELEPHONE (OUTPATIENT)
Dept: OTOLARYNGOLOGY | Facility: CLINIC | Age: 30
End: 2020-03-16

## 2020-03-16 NOTE — TELEPHONE ENCOUNTER
Left vm message for patient advising that referral for sleep study has been faxed to [facility] at [phone]. Call back number was provided on voicemail in the event of any further questions or concerns.  
Patient called today.    Patient has a currently referrral from Dr. Gordon at  ENT Clinic for a Sleep Eval and Study.    Patient would like the referral sent to [facility].    Telephone number for clinic is [phone].    Thank you.    Central Scheduling  Clarita GARZA  
Yes, the patient is being discharged from [facility]...

## 2020-06-10 ENCOUNTER — TELEPHONE (OUTPATIENT)
Dept: OTOLARYNGOLOGY | Facility: CLINIC | Age: 30
End: 2020-06-10

## 2020-06-10 NOTE — TELEPHONE ENCOUNTER
[facility] Call Center    Phone Message    May a detailed message be left on voicemail: yes     Reason for Call: Other: Pt would like referral sent to [facility] [phone] [address], [facility], [place]  Pt didn't have Fax #  If have additional questions please call Pt  Thank you,    Action Taken: Message routed to:  [facility] ([facility]): ENT    Travel Screening: Not Applicable

## 2020-06-10 NOTE — TELEPHONE ENCOUNTER
Referral placed 3/6/2020 faxed to [facility] [phone]. Note attached asking that they contact pt to schedule.     Natice Schwab, RN BSN

## 2020-07-08 ENCOUNTER — TRANSFERRED RECORDS (OUTPATIENT)
Dept: HEALTH INFORMATION MANAGEMENT | Facility: CLINIC | Age: 30
End: 2020-07-08

## 2021-01-04 ENCOUNTER — HEALTH MAINTENANCE LETTER (OUTPATIENT)
Age: 31
End: 2021-01-04

## 2021-04-25 ENCOUNTER — HEALTH MAINTENANCE LETTER (OUTPATIENT)
Age: 31
End: 2021-04-25

## 2021-10-10 ENCOUNTER — HEALTH MAINTENANCE LETTER (OUTPATIENT)
Age: 31
End: 2021-10-10

## 2022-05-22 ENCOUNTER — HEALTH MAINTENANCE LETTER (OUTPATIENT)
Age: 32
End: 2022-05-22

## 2022-09-06 NOTE — TELEPHONE ENCOUNTER
ONCOLOGY INTAKE: Records Information      APPT INFORMATION:   Referring provider:  NYLA MERCADO   Referring provider s clinic:  [facility]  Reason for visit/diagnosis:  Stenosis of Trachea  Has patient been notified of appointment date and time?: Yes    RECORDS INFORMATION:  Were the records received with the referral (via [employer])? Yes    Has patient been seen for any external appt for this diagnosis? Yes    If yes, where? [facility]    Has patient had any imaging or procedures outside of [facility] for this condition? yes      If Yes, where? [facility]    ADDITIONAL INFORMATION:       Detail Level: Zone

## 2022-09-18 ENCOUNTER — HEALTH MAINTENANCE LETTER (OUTPATIENT)
Age: 32
End: 2022-09-18

## 2023-06-04 ENCOUNTER — HEALTH MAINTENANCE LETTER (OUTPATIENT)
Age: 33
End: 2023-06-04

## (undated) RX ORDER — LIDOCAINE HYDROCHLORIDE 20 MG/ML
SOLUTION OROPHARYNGEAL
Status: DISPENSED
Start: 2020-03-06

## (undated) RX ORDER — LIDOCAINE HYDROCHLORIDE 40 MG/ML
SOLUTION TOPICAL
Status: DISPENSED
Start: 2020-03-06